# Patient Record
Sex: MALE | Race: WHITE | Employment: FULL TIME | URBAN - METROPOLITAN AREA
[De-identification: names, ages, dates, MRNs, and addresses within clinical notes are randomized per-mention and may not be internally consistent; named-entity substitution may affect disease eponyms.]

---

## 2019-12-19 ENCOUNTER — OFFICE VISIT (OUTPATIENT)
Dept: ENDOCRINOLOGY | Age: 69
End: 2019-12-19
Payer: MEDICARE

## 2019-12-19 ENCOUNTER — TELEPHONE (OUTPATIENT)
Dept: ENDOCRINOLOGY | Age: 69
End: 2019-12-19

## 2019-12-19 VITALS
HEART RATE: 78 BPM | HEIGHT: 69 IN | BODY MASS INDEX: 30.24 KG/M2 | DIASTOLIC BLOOD PRESSURE: 78 MMHG | WEIGHT: 204.2 LBS | RESPIRATION RATE: 16 BRPM | OXYGEN SATURATION: 95 % | SYSTOLIC BLOOD PRESSURE: 136 MMHG

## 2019-12-19 DIAGNOSIS — Z79.4 TYPE 2 DIABETES MELLITUS WITHOUT COMPLICATION, WITH LONG-TERM CURRENT USE OF INSULIN (HCC): ICD-10-CM

## 2019-12-19 DIAGNOSIS — E55.9 VITAMIN D DEFICIENCY: ICD-10-CM

## 2019-12-19 DIAGNOSIS — E11.9 TYPE 2 DIABETES MELLITUS WITHOUT COMPLICATION, WITH LONG-TERM CURRENT USE OF INSULIN (HCC): ICD-10-CM

## 2019-12-19 LAB — HBA1C MFR BLD: 7.1 %

## 2019-12-19 PROCEDURE — 83036 HEMOGLOBIN GLYCOSYLATED A1C: CPT | Performed by: INTERNAL MEDICINE

## 2019-12-19 PROCEDURE — 99204 OFFICE O/P NEW MOD 45 MIN: CPT | Performed by: INTERNAL MEDICINE

## 2019-12-19 RX ORDER — METFORMIN HYDROCHLORIDE 500 MG/1
TABLET, EXTENDED RELEASE ORAL
Qty: 360 TABLET | Refills: 3 | Status: SHIPPED | OUTPATIENT
Start: 2019-12-19 | End: 2020-06-22 | Stop reason: SINTOL

## 2019-12-19 RX ORDER — FLASH GLUCOSE SCANNING READER
EACH MISCELLANEOUS
COMMUNITY
End: 2022-07-06

## 2019-12-19 RX ORDER — FLASH GLUCOSE SENSOR
KIT MISCELLANEOUS
Qty: 6 EACH | Refills: 3 | Status: SHIPPED | OUTPATIENT
Start: 2019-12-19 | End: 2020-09-25 | Stop reason: SDUPTHER

## 2020-06-12 RX ORDER — INSULIN GLARGINE 100 [IU]/ML
35 INJECTION, SOLUTION SUBCUTANEOUS NIGHTLY
Qty: 15 PEN | Refills: 3 | Status: SHIPPED
Start: 2020-06-12 | End: 2020-06-22 | Stop reason: CLARIF

## 2020-06-22 ENCOUNTER — TELEPHONE (OUTPATIENT)
Dept: ENDOCRINOLOGY | Age: 70
End: 2020-06-22

## 2020-06-22 RX ORDER — INSULIN GLARGINE 100 [IU]/ML
35 INJECTION, SOLUTION SUBCUTANEOUS NIGHTLY
COMMUNITY
End: 2020-09-02 | Stop reason: SDUPTHER

## 2020-06-22 NOTE — TELEPHONE ENCOUNTER
The patient wanted to let you know that he dc'd his Metformin ER. He was concerned about the recall, but he also was still getting diarrhea from the ER, and he cannot tolerate the IR at all. He will send in blood sugar in 2wks.

## 2020-06-29 RX ORDER — METFORMIN HYDROCHLORIDE 500 MG/1
1000 TABLET, EXTENDED RELEASE ORAL 2 TIMES DAILY
Qty: 360 TABLET | Refills: 3 | Status: SHIPPED
Start: 2020-06-29 | End: 2021-07-15 | Stop reason: SDUPTHER

## 2020-09-02 RX ORDER — INSULIN GLARGINE 100 [IU]/ML
35 INJECTION, SOLUTION SUBCUTANEOUS NIGHTLY
Qty: 5 PEN | Refills: 3 | Status: SHIPPED
Start: 2020-09-02 | End: 2021-05-10 | Stop reason: SDUPTHER

## 2020-09-02 RX ORDER — PEN NEEDLE, DIABETIC 32GX 5/32"
NEEDLE, DISPOSABLE MISCELLANEOUS
Qty: 100 EACH | Refills: 5 | Status: SHIPPED
Start: 2020-09-02 | End: 2021-06-24

## 2020-09-25 RX ORDER — FLASH GLUCOSE SENSOR
KIT MISCELLANEOUS
Qty: 6 EACH | Refills: 3 | Status: SHIPPED
Start: 2020-09-25 | End: 2022-07-06

## 2020-11-25 ENCOUNTER — NURSE ONLY (OUTPATIENT)
Dept: ENDOCRINOLOGY | Age: 70
End: 2020-11-25
Payer: COMMERCIAL

## 2020-11-25 DIAGNOSIS — E11.9 TYPE 2 DIABETES MELLITUS WITHOUT COMPLICATION, WITH LONG-TERM CURRENT USE OF INSULIN (HCC): Primary | ICD-10-CM

## 2020-11-25 DIAGNOSIS — Z79.4 TYPE 2 DIABETES MELLITUS WITHOUT COMPLICATION, WITH LONG-TERM CURRENT USE OF INSULIN (HCC): Primary | ICD-10-CM

## 2020-11-25 LAB — HBA1C MFR BLD: 7.5 %

## 2020-11-25 PROCEDURE — 83036 HEMOGLOBIN GLYCOSYLATED A1C: CPT | Performed by: INTERNAL MEDICINE

## 2020-12-02 ENCOUNTER — VIRTUAL VISIT (OUTPATIENT)
Dept: ENDOCRINOLOGY | Age: 70
End: 2020-12-02
Payer: COMMERCIAL

## 2020-12-02 ENCOUNTER — TELEPHONE (OUTPATIENT)
Dept: ENDOCRINOLOGY | Age: 70
End: 2020-12-02

## 2020-12-02 PROCEDURE — 3051F HG A1C>EQUAL 7.0%<8.0%: CPT | Performed by: INTERNAL MEDICINE

## 2020-12-02 PROCEDURE — 99214 OFFICE O/P EST MOD 30 MIN: CPT | Performed by: INTERNAL MEDICINE

## 2020-12-02 RX ORDER — BLOOD-GLUCOSE SENSOR
EACH MISCELLANEOUS
Qty: 9 EACH | Refills: 3 | Status: SHIPPED | OUTPATIENT
Start: 2020-12-02 | End: 2020-12-17 | Stop reason: SDUPTHER

## 2020-12-02 RX ORDER — GLUCOSAMINE HCL/CHONDROITIN SU 500-400 MG
CAPSULE ORAL
Qty: 250 STRIP | Refills: 5 | Status: SHIPPED
Start: 2020-12-02 | End: 2021-07-06 | Stop reason: ALTCHOICE

## 2020-12-02 RX ORDER — BLOOD-GLUCOSE TRANSMITTER
1 EACH MISCELLANEOUS ONCE
Qty: 1 EACH | Refills: 0 | Status: SHIPPED | OUTPATIENT
Start: 2020-12-02 | End: 2020-12-02

## 2020-12-02 RX ORDER — PEN NEEDLE, DIABETIC 32GX 5/32"
NEEDLE, DISPOSABLE MISCELLANEOUS
Qty: 100 EACH | Refills: 5 | Status: SHIPPED
Start: 2020-12-02 | End: 2021-06-24 | Stop reason: SDUPTHER

## 2020-12-02 RX ORDER — BLOOD-GLUCOSE,RECEIVER,CONT
1 EACH MISCELLANEOUS ONCE
Qty: 1 DEVICE | Refills: 0 | Status: SHIPPED | OUTPATIENT
Start: 2020-12-02 | End: 2020-12-02

## 2020-12-02 NOTE — PROGRESS NOTES
Melissa Acuña was read the following message We want to confirm that, for purposes of billing, this is a virtual visit with your provider for which we will submit a claim for reimbursement with your insurance company. You will be responsible for any copays, coinsurance amounts or other amounts not covered by your insurance company. If you do not accept this, unfortunately we will not be able to schedule a virtual visit with the provider. Do you accept?  Benjamin Mosquera responded YES

## 2020-12-02 NOTE — LETTER
[address] [place] [address] Department of Endocrinology Diabetes and Metabolism   [address] [facility] [address]   Phone: [phone]  Fax: [phone]      Provider: Melina Hodgkins MD  Primary Care Physician: Julio Bills MD     Patient: Jacquelin Stroud  YOB: 1950  Date of Visit: 12/2/2020      Dear Dr. Julio Bills MD   I had the pleasure of seeing your patient Jacquelin Stroud today at endocrine clinic for follow up visit and I enclosed a copy of the office visit completed today. Thank you very much for asking us to participate in the care of this very pleasant patient. Please don't hesitate to call if there are any further questions or concerns. Sincerely   Melina Hodgkins MD  Endocrinologist, [address] [address]   Phone: [phone]  Fax: [phone]      [address] [place] [address] Department of Endocrinology Diabetes and Metabolism   [address] [place] [address]   Phone: [phone]  Fax: [phone]    Date of Service: 12/2/2020    Primary Care Physician: Julio Bills MD  Provider: Melina Hodgkins MD     Reason for the visit:  DM type 2 on insulin     History of Present Illness: The history is provided by the patient. No  was used. Accuracy of the patient data is excellent.   Jacquelin Stroud is a very pleasant 78 y.o. male seen today for diabetes management     Jacquelin Stroud was diagnosed with diabetes at age 50 and he is currently on Lantus 35 units daily at bedtime, Metformin 1000 mg BID, Jardiance 25 mg daily  The patient has been checking blood sugar 5-8 times day, most readings at goal   Most recent A1c results summarized below  Lab Results   Component Value Date    LABA1C 7.5 11/25/2020    LABA1C 7.1 12/19/2019    LABA1C 7.4 12/07/2016     Patient reported hypoglycemic episodes  The patient has been mindful of what has been eating and following diabetic diet as encouraged I reviewed current medications and the patient has no issues with diabetes medications  Brianna Barrera is up to date with eye exam and denied any history of diabetic retinopathy   The patient performs  own feet care  Microvascular complications:  No Retinopathy, Nephropathy or Neuropathy   Macrovascular complications: no CAD, PVD, or Stroke  The patient refuses Flushot and pneumonia vaccine     PAST MEDICAL HISTORY   Past Medical History:   Diagnosis Date    Essential hypertension 10/15/2015    Type 2 diabetes mellitus without complication ([facility] [address] 08/59/4941     PAST SURGICAL HISTORY   No past surgical history on file. SOCIAL HISTORY   Tobacco:   reports that he has quit smoking. He has never used smokeless tobacco.  Alcol:   has no history on file for alcohol. Illicit Drugs:   has no history on file for drug. FAMILY HISTORY   Family History   Problem Relation Age of Onset    Diabetes Father     Diabetes Paternal Grandfather      ALLERGIES AND DRUG REACTIONS   Allergies   Allergen Reactions    Metformin And Related Diarrhea    Beeswax        CURRENT MEDICATIONS   Current Outpatient Medications   Medication Sig Dispense Refill    Insulin Pen Needle (BD PEN NEEDLE PATTIE U/F) 32G X 4 MM MISC Uses with Lantus once a day 100 each 5    blood glucose monitor strips One-Touch-2 Ultra strips.  Check 4 times/day before meals and at bedtime and as needed for symptoms of irregular blood glucose 250 strip 5    Continuous Blood Gluc  (DEXCOM G6 ) TIFFANY 1 Device by Does not apply route once for 1 dose 1 Device 0    Continuous Blood Gluc Sensor (DEXCOM G6 SENSOR) MISC Change sensor every 10 days 9 each 3    Continuous Blood Gluc Transmit (DEXCOM G6 TRANSMITTER) MISC 1 each by Does not apply route once for 1 dose 1 each 0    Continuous Blood Gluc Sensor (FREESTYLE KATALINA 14 DAY SENSOR) MISC Change sensor every 14 days 6 each 3    insulin glargine (BASAGLAR KWIKPEN) 100 UNIT/ML injection pen Inject 35 Units into the skin nightly 5 pen 3    Insulin Pen Needle (BD PEN NEEDLE PATTIE U/F) 32G X 4 MM MISC Uses daily with insulin 100 each 5    metFORMIN (GLUCOPHAGE XR) 500 MG extended release tablet Take 2 tablets by mouth 2 times daily 360 tablet 3    Continuous Blood Gluc  (FREESTYLE KATALINA 14 DAY READER) TIFFANY by Does not apply route      aspirin 81 MG tablet Take 81 mg by mouth daily      empagliflozin (JARDIANCE) 25 MG tablet Take 25 mg by mouth daily 90 tablet 3    Insulin Pen Needle (BD PEN NEEDLE PATTIE U/F) 32G X 4 MM MISC Inject 4 times daily 100 each 11    amLODIPine (NORVASC) 10 MG tablet TAKE 1 TABLET BY MOUTH EVERY DAY 30 tablet 2    atorvastatin (LIPITOR) 80 MG tablet TAKE 1 TABLET BY MOUTH EVERY NIGHT AT BEDTIME 30 tablet 2    terazosin (HYTRIN) 2 MG capsule TAKE 1 CAPSULE BY MOUTH TWICE DAILY 60 capsule 2    lisinopril (PRINIVIL;ZESTRIL) 20 MG tablet Take 1 tablet by mouth daily 30 tablet 3    glucose blood VI test strips (ONE TOUCH ULTRA TEST) strip Test 2-4 times daily 100 each 3    EPINEPHrine (EPIPEN 2-RENITA) 0.3 MG/0.3ML SOAJ injection Inject 0.3 mLs into the skin once for 1 dose Use as directed for allergic reaction 0.3 mL 0     No current facility-administered medications for this visit. Review of Systems  Constitutional: No fever, no chills, no diaphoresis, no generalized weakness. HEENT: No blurred vision, No sore throat, no ear pain, no hair loss  Neck: denied any neck swelling, difficulty swallowing,   Cardio-pulmonary: No CP, SOB or palpitation, No orthopnea or PND. No cough or wheezing. GI: No N/V/D, no constipation, No abdominal pain, no melena or hematochezia   : Denied any dysuria, hematuria, flank pain, discharge, or incontinence. Skin: denied any rash, ulcer, Hirsute, or hyperpigmentation. MSK: denied any joint deformity, joint pain/swelling, muscle pain, or back pain.   Neuro: no numbness, no tingling, no weakness, _    OBJECTIVE There were no vitals taken for this visit. BP Readings from Last 4 Encounters:   12/19/19 136/78   12/07/16 125/67   03/31/16 140/80   11/16/15 143/86     Wt Readings from Last 6 Encounters:   12/19/19 204 lb 3.2 oz (92.6 kg)   12/07/16 216 lb (98 kg)   03/31/16 212 lb (96.2 kg)   11/16/15 204 lb (92.5 kg)   10/15/15 203 lb (92.1 kg)     Physical examination:  Due to this being a TeleHealth encounter, evaluation of the following organ systems is limited: Vitals/Constitutional/EENT/Resp/CV/GI//MS/Neuro/Skin/Heme-Lymph-Imm. Modified physical exam through Telemedicine camera    General: Communicating well via camera   Neck: no obvious neck mass. No obvious neck deformity     CVS: no distress   Chest: no distress. Chest is moving with respiration    Extremities:  no visible tremor  Skin: No visible rashes as seen from camera   Musculoskeletal: no visible deformity  Neuro: Alert and oriented to person, place, and time. Psychiatric: Normal mood and affect.  Behavior is normal    Review of Laboratory Data:  I personally reviewed the following lab:  Lab Results   Component Value Date/Time    WBC 8.3 10/15/2015 11:00 AM    RBC 5.26 10/15/2015 11:00 AM    HGB 14.8 10/15/2015 11:00 AM    HCT 45.6 10/15/2015 11:00 AM    MCV 86.6 10/15/2015 11:00 AM    MCH 28.0 10/15/2015 11:00 AM    MCHC 32.4 10/15/2015 11:00 AM    RDW 14.0 10/15/2015 11:00 AM     10/15/2015 11:00 AM    MPV 8.4 10/15/2015 11:00 AM      Lab Results   Component Value Date/Time     10/15/2015 11:00 AM    K 4.6 10/15/2015 11:00 AM    CO2 26 10/15/2015 11:00 AM    BUN 19 10/15/2015 11:00 AM    CREATININE 1.1 10/15/2015 11:00 AM    CALCIUM 9.9 10/15/2015 11:00 AM    LABGLOM >60 10/15/2015 11:00 AM    GFRAA >60 10/15/2015 11:00 AM      No results found for: TSH, T4FREE, S5YRDEI, FT3, V8UAQVW, TSI, TPOABS, THGAB  Lab Results   Component Value Date    LABA1C 7.5 11/25/2020    GLUCOSE 155 10/15/2015    MALBCR 84.8 10/15/2015 LABMICR 33.9 10/15/2015    LABCREA 40 10/15/2015     Lab Results   Component Value Date    LABA1C 7.5 11/25/2020    LABA1C 7.1 12/19/2019    LABA1C 7.4 12/07/2016     Lab Results   Component Value Date    CHOL 167 10/15/2015    TRIG 111 10/15/2015    HDL 44 10/15/2015     No results found for: [address] [address] Records/Labs/Images review:   I personally reviewed and summarized previous records   All labs and imaging studies were independently reviewed     ASSESSMENT & RECOMMENDATIONS   Ted Snider, a 78 y.o.-old male seen in for the following issues     Diabetes Mellitus Type 2     · A1c 7.5%   · Change Lantus 30 units daily at bedtime, Metformin 1000 mg BID, Jardiance 25 mg daily  · Pt was advised to continue checking BG 4-8 times a day with meals and at night an dnas needed for high and low BS   · Will order DEXCOM CGM   · Discussed with patient A1c and blood sugar goals   · Patient up to date with the routine diabetes maintenance and prevention  · Diabetes labs before next visit     Hyperlipidemia   · Continue Lipitor 80 mg daily   · Check level before next visit     HTN  · Under good control, continue Lisinopril 20 mg daily and Norvasc 10 mg daily     vitD deficiency   · Check vitD level     Return in about 4 months (around 4/2/2021) for DM type 2 . The above issues were reviewed with the patient who understood and agreed with the plan. Thank you for allowing us to participate in the care of this patient. Please do not hesitate to contact us with any additional questions. Diagnosis Orders   1. Type 2 diabetes mellitus without complication, with long-term current use of insulin ([facility])  Insulin Pen Needle (BD PEN NEEDLE PATTEI U/F) 32G X 4 MM MISC    blood glucose monitor strips   2. Vitamin D deficiency     3.  Type 2 diabetes mellitus with other specified complication, with long-term current use of insulin ([address]  Continuous Blood Gluc  ([address]) [address] Continuous Blood Gluc Sensor (DEXCOM G6 SENSOR) MISC    Continuous Blood Gluc Transmit (DEXCOM G6 TRANSMITTER) MISC     Neli Cuellar MD  Endocrinologist, [facility] - [facility] Diabetes Care and Endocrinology   [address] [facility], [address] [place] [address],[address] [address]   Phone: [phone]  Fax: [phone]  --------------------------------------------  An electronic signature was used to authenticate this note. Naomi Suazo MD on 12/2/2020 at 9:37 PM    This visit was performed as a virtual video visit using a synchronous, two-way, audio-video telehealth technology platform  This Virtual  Visit was conducted, with patient's consent, to reduce the patient's risk of exposure to COVID-19 and provide continuity of care.

## 2020-12-02 NOTE — PROGRESS NOTES
[address] [place] [address] Department of Endocrinology Diabetes and Metabolism   [address] [facility] [address]   Phone: [phone]  Fax: [phone]    Date of Service: 12/2/2020    Primary Care Physician: Abisai Florian MD  Provider: Fab Henderson MD     Reason for the visit:  DM type 2 on insulin     History of Present Illness: The history is provided by the patient. No  was used. Accuracy of the patient data is excellent. Ashley Moore is a very pleasant 78 y.o. male seen today for diabetes management     Ashley Moore was diagnosed with diabetes at age 50 and he is currently on Lantus 35 units daily at bedtime, Metformin 1000 mg BID, Jardiance 25 mg daily  The patient has been checking blood sugar 5-8 times day, most readings at goal   Most recent A1c results summarized below  Lab Results   Component Value Date    LABA1C 7.5 11/25/2020    LABA1C 7.1 12/19/2019    LABA1C 7.4 12/07/2016     Patient reported hypoglycemic episodes  The patient has been mindful of what has been eating and following diabetic diet as encouraged  I reviewed current medications and the patient has no issues with diabetes medications  Ashley Moore is up to date with eye exam and denied any history of diabetic retinopathy   The patient performs  own feet care  Microvascular complications:  No Retinopathy, Nephropathy or Neuropathy   Macrovascular complications: no CAD, PVD, or Stroke  The patient refuses Flushot and pneumonia vaccine     PAST MEDICAL HISTORY   Past Medical History:   Diagnosis Date    Essential hypertension 10/15/2015    Type 2 diabetes mellitus without complication ([facility] [address] 25/18/1481     PAST SURGICAL HISTORY   No past surgical history on file. SOCIAL HISTORY   Tobacco:   reports that he has quit smoking. He has never used smokeless tobacco.  Alcol:   has no history on file for alcohol. Illicit Drugs:   has no history on file for drug.     FAMILY HISTORY   Family History   Problem Relation Age of Onset    Diabetes Father     Diabetes Paternal Grandfather      ALLERGIES AND DRUG REACTIONS   Allergies   Allergen Reactions    Metformin And Related Diarrhea    Beeswax        CURRENT MEDICATIONS   Current Outpatient Medications   Medication Sig Dispense Refill    Insulin Pen Needle (BD PEN NEEDLE PATTIE U/F) 32G X 4 MM MISC Uses with Lantus once a day 100 each 5    blood glucose monitor strips One-Touch-2 Ultra strips.  Check 4 times/day before meals and at bedtime and as needed for symptoms of irregular blood glucose 250 strip 5    Continuous Blood Gluc  (DEXCOM G6 ) TIFFANY 1 Device by Does not apply route once for 1 dose 1 Device 0    Continuous Blood Gluc Sensor (DEXCOM G6 SENSOR) MISC Change sensor every 10 days 9 each 3    Continuous Blood Gluc Transmit (DEXCOM G6 TRANSMITTER) MISC 1 each by Does not apply route once for 1 dose 1 each 0    Continuous Blood Gluc Sensor (FREESTYLE KATALINA 14 DAY SENSOR) MISC Change sensor every 14 days 6 each 3    insulin glargine (BASAGLAR KWIKPEN) 100 UNIT/ML injection pen Inject 35 Units into the skin nightly 5 pen 3    Insulin Pen Needle (BD PEN NEEDLE PATTIE U/F) 32G X 4 MM MISC Uses daily with insulin 100 each 5    metFORMIN (GLUCOPHAGE XR) 500 MG extended release tablet Take 2 tablets by mouth 2 times daily 360 tablet 3    Continuous Blood Gluc  (FREESTYLE KATALINA 14 DAY READER) TIFFANY by Does not apply route      aspirin 81 MG tablet Take 81 mg by mouth daily      empagliflozin (JARDIANCE) 25 MG tablet Take 25 mg by mouth daily 90 tablet 3    Insulin Pen Needle (BD PEN NEEDLE PATTIE U/F) 32G X 4 MM MISC Inject 4 times daily 100 each 11    amLODIPine (NORVASC) 10 MG tablet TAKE 1 TABLET BY MOUTH EVERY DAY 30 tablet 2    atorvastatin (LIPITOR) 80 MG tablet TAKE 1 TABLET BY MOUTH EVERY NIGHT AT BEDTIME 30 tablet 2    terazosin (HYTRIN) 2 MG capsule TAKE 1 CAPSULE BY MOUTH TWICE DAILY 60 capsule 2    lisinopril (PRINIVIL;ZESTRIL) 20 MG tablet Take 1 tablet by mouth daily 30 tablet 3    glucose blood VI test strips (ONE TOUCH ULTRA TEST) strip Test 2-4 times daily 100 each 3    EPINEPHrine (EPIPEN 2-RENITA) 0.3 MG/0.3ML SOAJ injection Inject 0.3 mLs into the skin once for 1 dose Use as directed for allergic reaction 0.3 mL 0     No current facility-administered medications for this visit. Review of Systems  Constitutional: No fever, no chills, no diaphoresis, no generalized weakness. HEENT: No blurred vision, No sore throat, no ear pain, no hair loss  Neck: denied any neck swelling, difficulty swallowing,   Cardio-pulmonary: No CP, SOB or palpitation, No orthopnea or PND. No cough or wheezing. GI: No N/V/D, no constipation, No abdominal pain, no melena or hematochezia   : Denied any dysuria, hematuria, flank pain, discharge, or incontinence. Skin: denied any rash, ulcer, Hirsute, or hyperpigmentation. MSK: denied any joint deformity, joint pain/swelling, muscle pain, or back pain. Neuro: no numbness, no tingling, no weakness, _    OBJECTIVE    There were no vitals taken for this visit. BP Readings from Last 4 Encounters:   12/19/19 136/78   12/07/16 125/67   03/31/16 140/80   11/16/15 143/86     Wt Readings from Last 6 Encounters:   12/19/19 204 lb 3.2 oz (92.6 kg)   12/07/16 216 lb (98 kg)   03/31/16 212 lb (96.2 kg)   11/16/15 204 lb (92.5 kg)   10/15/15 203 lb (92.1 kg)     Physical examination:  Due to this being a TeleHealth encounter, evaluation of the following organ systems is limited: Vitals/Constitutional/EENT/Resp/CV/GI//MS/Neuro/Skin/Heme-Lymph-Imm. Modified physical exam through Telemedicine camera    General: Communicating well via camera   Neck: no obvious neck mass. No obvious neck deformity     CVS: no distress   Chest: no distress.  Chest is moving with respiration    Extremities:  no visible tremor  Skin: No visible rashes as seen from camera   Musculoskeletal: no visible · Patient up to date with the routine diabetes maintenance and prevention  · Diabetes labs before next visit     Hyperlipidemia   · Continue Lipitor 80 mg daily   · Check level before next visit     HTN  · Under good control, continue Lisinopril 20 mg daily and Norvasc 10 mg daily     vitD deficiency   · Check vitD level     Return in about 4 months (around 4/2/2021) for DM type 2 . The above issues were reviewed with the patient who understood and agreed with the plan. Thank you for allowing us to participate in the care of this patient. Please do not hesitate to contact us with any additional questions. Diagnosis Orders   1. Type 2 diabetes mellitus without complication, with long-term current use of insulin ([facility])  Insulin Pen Needle (BD PEN NEEDLE PATTIE U/F) 32G X 4 MM MISC    blood glucose monitor strips   2. Vitamin D deficiency     3. Type 2 diabetes mellitus with other specified complication, with long-term current use of insulin ([facility])  Continuous Blood Gluc  (DEXCOM G6 ) TIFFANY    Continuous Blood Gluc Sensor (DEXCOM G6 SENSOR) MISC    Continuous Blood Gluc Transmit (DEXCOM G6 TRANSMITTER) MISC     Neli Cuellar MD  Endocrinologist, [facility] - [facility] Diabetes Care and Endocrinology   [address] [place] [address]   Phone: [phone]  Fax: [phone]  --------------------------------------------  An electronic signature was used to authenticate this note. Naomi Suazo MD on 12/2/2020 at 9:37 PM    This visit was performed as a virtual video visit using a synchronous, two-way, audio-video telehealth technology platform  This Virtual  Visit was conducted, with patient's consent, to reduce the patient's risk of exposure to COVID-19 and provide continuity of care.

## 2020-12-17 RX ORDER — BLOOD-GLUCOSE TRANSMITTER
EACH MISCELLANEOUS
Qty: 1 EACH | Refills: 3 | Status: SHIPPED
Start: 2020-12-17 | End: 2022-07-06

## 2020-12-17 RX ORDER — BLOOD-GLUCOSE SENSOR
EACH MISCELLANEOUS
Qty: 9 EACH | Refills: 3 | Status: SHIPPED
Start: 2020-12-17 | End: 2022-07-06

## 2021-05-10 DIAGNOSIS — Z79.4 TYPE 2 DIABETES MELLITUS WITHOUT COMPLICATION, WITH LONG-TERM CURRENT USE OF INSULIN (HCC): ICD-10-CM

## 2021-05-10 DIAGNOSIS — E11.9 TYPE 2 DIABETES MELLITUS WITHOUT COMPLICATION, WITH LONG-TERM CURRENT USE OF INSULIN (HCC): ICD-10-CM

## 2021-05-10 RX ORDER — INSULIN GLARGINE 100 [IU]/ML
30 INJECTION, SOLUTION SUBCUTANEOUS NIGHTLY
Qty: 5 PEN | Refills: 5 | Status: SHIPPED
Start: 2021-05-10 | End: 2022-03-03 | Stop reason: SDUPTHER

## 2021-06-07 ENCOUNTER — TELEPHONE (OUTPATIENT)
Dept: ENDOCRINOLOGY | Age: 71
End: 2021-06-07

## 2021-06-07 NOTE — TELEPHONE ENCOUNTER
His BUN was slightly elevated but both Creatinine and GFR are very good.  Most common cause of elevated BUN is dehydration     It should be ok to continue current DM medications

## 2021-06-07 NOTE — TELEPHONE ENCOUNTER
I spoke with Hua Varghese and gave him the update on BUN and cont. With same DM meds. I also stated he may want to try and get more fluids in during the day. He is concerned also about his BS going up into the mid to high 200 after he eats but he said they do come down after a long while, should we have him send in some BS logs to look at this? He will be starting radiation in a couple of weeks, he wanted to let us know that also.

## 2021-06-11 NOTE — TELEPHONE ENCOUNTER
He has Dexacom, we need his email address. He is at [facility] today for appointments.

## 2021-06-24 DIAGNOSIS — Z79.4 TYPE 2 DIABETES MELLITUS WITHOUT COMPLICATION, WITH LONG-TERM CURRENT USE OF INSULIN (HCC): ICD-10-CM

## 2021-06-24 DIAGNOSIS — E11.9 TYPE 2 DIABETES MELLITUS WITHOUT COMPLICATION, WITH LONG-TERM CURRENT USE OF INSULIN (HCC): ICD-10-CM

## 2021-06-24 RX ORDER — PEN NEEDLE, DIABETIC 32GX 5/32"
NEEDLE, DISPOSABLE MISCELLANEOUS
Qty: 100 EACH | Refills: 3 | Status: SHIPPED
Start: 2021-06-24 | End: 2022-07-28 | Stop reason: SDUPTHER

## 2021-07-06 DIAGNOSIS — Z79.4 TYPE 2 DIABETES MELLITUS WITHOUT COMPLICATION, WITH LONG-TERM CURRENT USE OF INSULIN (HCC): Primary | ICD-10-CM

## 2021-07-06 DIAGNOSIS — E11.9 TYPE 2 DIABETES MELLITUS WITHOUT COMPLICATION, WITH LONG-TERM CURRENT USE OF INSULIN (HCC): Primary | ICD-10-CM

## 2021-07-12 ENCOUNTER — TELEPHONE (OUTPATIENT)
Dept: ENDOCRINOLOGY | Age: 71
End: 2021-07-12

## 2021-07-12 NOTE — TELEPHONE ENCOUNTER
dexcom dl   Called patient left message to change diabetic regimen to lantus 30 hs to lantus 35 units    Left message to return our call that he got our message

## 2021-07-15 RX ORDER — METFORMIN HYDROCHLORIDE 500 MG/1
1000 TABLET, EXTENDED RELEASE ORAL 2 TIMES DAILY
Qty: 360 TABLET | Refills: 3 | Status: SHIPPED
Start: 2021-07-15 | End: 2021-07-19 | Stop reason: SDUPTHER

## 2021-07-19 RX ORDER — METFORMIN HYDROCHLORIDE 500 MG/1
1000 TABLET, EXTENDED RELEASE ORAL 2 TIMES DAILY
Qty: 360 TABLET | Refills: 3 | Status: SHIPPED
Start: 2021-07-19 | End: 2022-07-28 | Stop reason: SDUPTHER

## 2022-03-03 ENCOUNTER — TELEMEDICINE (OUTPATIENT)
Dept: ENDOCRINOLOGY | Age: 72
End: 2022-03-03
Payer: COMMERCIAL

## 2022-03-03 DIAGNOSIS — E55.9 VITAMIN D DEFICIENCY: ICD-10-CM

## 2022-03-03 DIAGNOSIS — Z91.119 DIETARY NONCOMPLIANCE: ICD-10-CM

## 2022-03-03 DIAGNOSIS — Z79.4 TYPE 2 DIABETES MELLITUS WITHOUT COMPLICATION, WITH LONG-TERM CURRENT USE OF INSULIN (HCC): ICD-10-CM

## 2022-03-03 DIAGNOSIS — E66.09 CLASS 1 OBESITY DUE TO EXCESS CALORIES WITHOUT SERIOUS COMORBIDITY WITH BODY MASS INDEX (BMI) OF 30.0 TO 30.9 IN ADULT: ICD-10-CM

## 2022-03-03 DIAGNOSIS — Z79.4 TYPE 2 DIABETES MELLITUS WITHOUT COMPLICATION, WITH LONG-TERM CURRENT USE OF INSULIN (HCC): Primary | ICD-10-CM

## 2022-03-03 DIAGNOSIS — E11.9 TYPE 2 DIABETES MELLITUS WITHOUT COMPLICATION, WITH LONG-TERM CURRENT USE OF INSULIN (HCC): ICD-10-CM

## 2022-03-03 DIAGNOSIS — E11.9 TYPE 2 DIABETES MELLITUS WITHOUT COMPLICATION, WITH LONG-TERM CURRENT USE OF INSULIN (HCC): Primary | ICD-10-CM

## 2022-03-03 PROCEDURE — 99214 OFFICE O/P EST MOD 30 MIN: CPT | Performed by: NURSE PRACTITIONER

## 2022-03-03 RX ORDER — INSULIN GLARGINE 100 [IU]/ML
30 INJECTION, SOLUTION SUBCUTANEOUS NIGHTLY
Qty: 15 ML | Refills: 5 | Status: SHIPPED
Start: 2022-03-03 | End: 2022-07-06

## 2022-03-03 NOTE — PROGRESS NOTES
never used smokeless tobacco.  Alcohol:   has no history on file for alcohol use. Drugs:   has no history on file for drug use.     FAMILY HISTORY   Family History   Problem Relation Age of Onset    Diabetes Father     Diabetes Paternal Grandfather        ALLERGIES AND DRUG REACTIONS   Allergies   Allergen Reactions    Metformin And Related Diarrhea    Beeswax        CURRENT MEDICATIONS   Current Outpatient Medications   Medication Sig Dispense Refill    insulin glargine (BASAGLAR KWIKPEN) 100 UNIT/ML injection pen Inject 30 Units into the skin nightly (Patient taking differently: Inject 31 Units into the skin nightly ) 15 mL 5    metFORMIN (GLUCOPHAGE XR) 500 MG extended release tablet Take 2 tablets by mouth 2 times daily 360 tablet 3    empagliflozin (JARDIANCE) 25 MG tablet Take 25 mg by mouth daily 90 tablet 3    blood glucose test strips (ONE TOUCH ULTRA TEST) strip To test 4x/day 50 each 11    Insulin Pen Needle (BD PEN NEEDLE PATTIE U/F) 32G X 4 MM MISC To use 1x/day 100 each 3    Continuous Blood Gluc Sensor (DEXCOM G6 SENSOR) MISC Change sensor every 10 days 9 each 3    Continuous Blood Gluc Transmit (DEXCOM G6 TRANSMITTER) MISC To use with sensor to change every 90 days 1 each 3    Continuous Blood Gluc Sensor (FREESTYLE KATALINA 14 DAY SENSOR) MISC Change sensor every 14 days 6 each 3    Continuous Blood Gluc  (FREESTYLE KATALINA 14 DAY READER) TIFFANY by Does not apply route      aspirin 81 MG tablet Take 81 mg by mouth daily      amLODIPine (NORVASC) 10 MG tablet TAKE 1 TABLET BY MOUTH EVERY DAY 30 tablet 2    atorvastatin (LIPITOR) 80 MG tablet TAKE 1 TABLET BY MOUTH EVERY NIGHT AT BEDTIME 30 tablet 2    terazosin (HYTRIN) 2 MG capsule TAKE 1 CAPSULE BY MOUTH TWICE DAILY 60 capsule 2    lisinopril (PRINIVIL;ZESTRIL) 20 MG tablet Take 1 tablet by mouth daily 30 tablet 3    EPINEPHrine (EPIPEN 2-RENITA) 0.3 MG/0.3ML SOAJ injection Inject 0.3 mLs into the skin once for 1 dose Use as directed for allergic reaction 0.3 mL 0     No current facility-administered medications for this visit. Review of Systems  Constitutional: No fever, no chills, no diaphoresis, no generalized weakness. HEENT: No blurred vision, No sore throat, no ear pain, no hair loss  Neck: denied any neck swelling, difficulty swallowing,   Cardio-pulmonary: No CP, SOB or palpitation, No orthopnea or PND. No cough or wheezing. GI: No N/V/D, no constipation, No abdominal pain, no melena or hematochezia   : Denied any dysuria, hematuria, flank pain, discharge, or incontinence. Skin: denied any rash, ulcer, Hirsute, or hyperpigmentation. MSK: denied any joint deformity, joint pain/swelling, muscle pain, or back pain. Neuro: no numbness, no tingling, no weakness,    OBJECTIVE    There were no vitals taken for this visit. BP Readings from Last 4 Encounters:   12/19/19 136/78   12/07/16 125/67   03/31/16 140/80   11/16/15 143/86     Wt Readings from Last 6 Encounters:   12/19/19 204 lb 3.2 oz (92.6 kg)   12/07/16 216 lb (98 kg)   03/31/16 212 lb (96.2 kg)   11/16/15 204 lb (92.5 kg)   10/15/15 203 lb (92.1 kg)       Physical examination:  Due to this being a TeleHealth encounter, evaluation of the following organ systems is limited: Vitals/Constitutional/EENT/Resp/CV/GI//MS/Neuro/Skin/Heme-Lymph-Imm. Modified physical exam through Telemedicine camera    General: Communicating well via camera   Neck: no obvious neck mass. No obvious neck deformity     CVS: no distress   Chest: no distress. Chest is moving with respiration    Extremities:  no visible tremor  Skin: No visible rashes as seen from camera   Musculoskeletal: no visible deformity  Neuro: Alert and oriented to person, place, and time. Psychiatric: Normal mood and affect.  Behavior is normal    Review of Laboratory Data:  I personally reviewed the following lab:  Lab Results   Component Value Date/Time    WBC 8.3 10/15/2015 11:00 AM    RBC 5.26 10/15/2015 11:00 AM HGB 14.8 10/15/2015 11:00 AM    HCT 45.6 10/15/2015 11:00 AM    MCV 86.6 10/15/2015 11:00 AM    MCH 28.0 10/15/2015 11:00 AM    MCHC 32.4 10/15/2015 11:00 AM    RDW 14.0 10/15/2015 11:00 AM     10/15/2015 11:00 AM    MPV 8.4 10/15/2015 11:00 AM      Lab Results   Component Value Date/Time     10/15/2015 11:00 AM    K 4.6 10/15/2015 11:00 AM    CO2 26 10/15/2015 11:00 AM    BUN 19 10/15/2015 11:00 AM    CREATININE 1.1 10/15/2015 11:00 AM    CALCIUM 9.9 10/15/2015 11:00 AM    LABGLOM >60 10/15/2015 11:00 AM    GFRAA >60 10/15/2015 11:00 AM      No results found for: TSH, T4FREE, N4ORYBD, FT3, K7NEERI, TSI, TPOABS, THGAB  Lab Results   Component Value Date    LABA1C 7.5 11/25/2020    GLUCOSE 155 10/15/2015    MALBCR 84.8 10/15/2015    LABMICR 33.9 10/15/2015    LABCREA 40 10/15/2015     Lab Results   Component Value Date    LABA1C 7.5 11/25/2020    LABA1C 7.1 12/19/2019    LABA1C 7.4 12/07/2016     Lab Results   Component Value Date    TRIG 111 10/15/2015    HDL 44 10/15/2015    LDLCALC 101 10/15/2015    CHOL 167 10/15/2015     No results found for: VITD25    ASSESSMENT & RECOMMENDATIONS   Nabeel Chacon, a 73 y.o.-old male seen in for the following issues       Assessment:      Diagnosis Orders   1. Type 2 diabetes mellitus without complication, with long-term current use of insulin ([address]     2. Vitamin D deficiency     3. Dietary noncompliance     4. Class 1 obesity due to excess calories without serious comorbidity with body mass index (BMI) of 30.0 to 30.9 in adult         Plan:     1. Type 2 diabetes mellitus without complication, with long-term current use of insulin ([address]   · Patient's diabetes is slightly worsened.  A1c per recall 8%  · Will change DM regimen to  Basaglar  33 units daily at bedtime, Metformin 1000 mg BID, Jardiance 25 mg daily  · The patient was advised to check blood sugars 2 times a day before meals and at bedtime varying times and send BS readings to our office in 2 weeks.  · Pt not able to tolerate Joelle and Dexcom CGM due to skin reactions of adhesives  · Discussed with patient A1c and blood sugar goals   · Optimal blood sugars: 100-140 pre-prandial, < 180 peak post-prandial  · Patient was counselled about the importance of self-blood glucose monitoring and eating consistent carb diet to avoid blood sugar fluctuations   · Patient is up to date with routine diabetes maintenance and prevention  · [facility] (pt reports easier to have labs drawn through [place]'s services)  ·    2. Vitamin D deficiency   · [facility]  · Pt taking 50,000 iu monthly     3. Dietary noncompliance   · Discussed with patient the importance of eating consistent carbohydrate meals, avoiding high glycemic index food. Also, discussed with patient the risk and negative consequences of dietary noncompliance on blood glucose control, blood pressure and weight     4. Class 1 obesity due to excess calories without serious comorbidity with body mass index (BMI) of 30.0 to 30.9 in adult   · Discussed lifestyle changes including diet and exercise with patient in depth. Also discussed with patient cardiovascular risk associated with obesity       I personally spent > 30 minutes reviewing external notes from PCP and other patient's care team providers, and personally interpreted labs associated with the above diagnosis. I also ordered labs to further assess and manage the above addressed medical conditions. Return in about 4 months (around 7/3/2022). The above issues were reviewed with the patient who understood and agreed with the plan. Thank you for allowing us to participate in the care of this patient. Please do not hesitate to contact us with any additional questions.      Heather Rainey, AGUILAR - PRAVIN [facility] - BE[facility] Diabetes Care and Endocrinology   [address] [facility] [address]   Phone: [phone]  Fax: [phone]  --------------------------------------------  An electronic signature was used to authenticate this note. [address], AGUILAR - NP on 3/3/2022 at 2:51 PM    This visit was performed as a virtual video visit using a synchronous, two-way, audio-video telehealth technology platform  This Virtual  Visit was conducted, with patient's consent, to reduce the patient's risk of exposure to COVID-19 and provide continuity of care.

## 2022-03-03 NOTE — PROGRESS NOTES
Kirit Romero was read the following message We want to confirm that, for purposes of billing, this is a virtual visit with your provider for which we will submit a claim for reimbursement with your insurance company. You will be responsible for any copays, coinsurance amounts or other amounts not covered by your insurance company. If you do not accept this, unfortunately we will not be able to schedule or proceed with a virtual visit with the provider. Do you accept? Funmi Givens responded Yes .

## 2022-07-06 ENCOUNTER — OFFICE VISIT (OUTPATIENT)
Dept: ENDOCRINOLOGY | Age: 72
End: 2022-07-06
Payer: COMMERCIAL

## 2022-07-06 VITALS
HEART RATE: 83 BPM | BODY MASS INDEX: 27.76 KG/M2 | WEIGHT: 188 LBS | OXYGEN SATURATION: 97 % | DIASTOLIC BLOOD PRESSURE: 79 MMHG | SYSTOLIC BLOOD PRESSURE: 157 MMHG

## 2022-07-06 DIAGNOSIS — Z91.119 DIETARY NONCOMPLIANCE: ICD-10-CM

## 2022-07-06 DIAGNOSIS — E55.9 VITAMIN D DEFICIENCY: ICD-10-CM

## 2022-07-06 DIAGNOSIS — Z79.4 TYPE 2 DIABETES MELLITUS WITHOUT COMPLICATION, WITH LONG-TERM CURRENT USE OF INSULIN (HCC): ICD-10-CM

## 2022-07-06 DIAGNOSIS — Z79.4 TYPE 2 DIABETES MELLITUS WITHOUT COMPLICATION, WITH LONG-TERM CURRENT USE OF INSULIN (HCC): Primary | ICD-10-CM

## 2022-07-06 DIAGNOSIS — E11.9 TYPE 2 DIABETES MELLITUS WITHOUT COMPLICATION, WITH LONG-TERM CURRENT USE OF INSULIN (HCC): ICD-10-CM

## 2022-07-06 DIAGNOSIS — E11.9 TYPE 2 DIABETES MELLITUS WITHOUT COMPLICATION, WITH LONG-TERM CURRENT USE OF INSULIN (HCC): Primary | ICD-10-CM

## 2022-07-06 LAB
ANION GAP SERPL CALCULATED.3IONS-SCNC: 16 MMOL/L (ref 7–16)
BUN BLDV-MCNC: 25 MG/DL (ref 6–23)
CALCIUM SERPL-MCNC: 9.1 MG/DL (ref 8.6–10.2)
CHLORIDE BLD-SCNC: 104 MMOL/L (ref 98–107)
CHOLESTEROL, TOTAL: 107 MG/DL (ref 0–199)
CO2: 18 MMOL/L (ref 22–29)
CREAT SERPL-MCNC: 1.1 MG/DL (ref 0.7–1.2)
CREATININE URINE: 33 MG/DL (ref 40–278)
GFR AFRICAN AMERICAN: >60
GFR NON-AFRICAN AMERICAN: >60 ML/MIN/1.73
GLUCOSE BLD-MCNC: 231 MG/DL (ref 74–99)
HBA1C MFR BLD: 7.5 %
HCT VFR BLD CALC: 41.5 % (ref 37–54)
HDLC SERPL-MCNC: 36 MG/DL
HEMOGLOBIN: 13.8 G/DL (ref 12.5–16.5)
LDL CHOLESTEROL CALCULATED: 50 MG/DL (ref 0–99)
MCH RBC QN AUTO: 28.5 PG (ref 26–35)
MCHC RBC AUTO-ENTMCNC: 33.3 % (ref 32–34.5)
MCV RBC AUTO: 85.7 FL (ref 80–99.9)
PDW BLD-RTO: 13.5 FL (ref 11.5–15)
PLATELET # BLD: 259 E9/L (ref 130–450)
PMV BLD AUTO: 10 FL (ref 7–12)
POTASSIUM SERPL-SCNC: 3.9 MMOL/L (ref 3.5–5)
PROTEIN PROTEIN: 5 MG/DL (ref 0–12)
PROTEIN/CREAT RATIO: 0.2
PROTEIN/CREAT RATIO: 0.2 (ref 0–0.2)
RBC # BLD: 4.84 E12/L (ref 3.8–5.8)
SODIUM BLD-SCNC: 138 MMOL/L (ref 132–146)
TRIGL SERPL-MCNC: 104 MG/DL (ref 0–149)
VITAMIN D 25-HYDROXY: 32 NG/ML (ref 30–100)
VLDLC SERPL CALC-MCNC: 21 MG/DL
WBC # BLD: 8.3 E9/L (ref 4.5–11.5)

## 2022-07-06 PROCEDURE — 1123F ACP DISCUSS/DSCN MKR DOCD: CPT | Performed by: NURSE PRACTITIONER

## 2022-07-06 PROCEDURE — 83036 HEMOGLOBIN GLYCOSYLATED A1C: CPT | Performed by: NURSE PRACTITIONER

## 2022-07-06 PROCEDURE — 99214 OFFICE O/P EST MOD 30 MIN: CPT | Performed by: NURSE PRACTITIONER

## 2022-07-06 PROCEDURE — 3051F HG A1C>EQUAL 7.0%<8.0%: CPT | Performed by: NURSE PRACTITIONER

## 2022-07-06 NOTE — PROGRESS NOTES
[address] [facility] Department of Endocrinology Diabetes and Metabolism   [address] [facility] [address]   Phone: [phone]  Fax: [phone]    Date of Service: 7/6/2022    Primary Care Physician: Vijaya Sharp MD  Referring physician: No ref. provider found  Provider: AGUILAR Stoomayor NP     Reason for the visit:  Dm Type 2 on insulin    History of Present Illness: The history is provided by the patient. No  was used. Accuracy of the patient data is excellent. Prosper Jane is a very pleasant 73 y.o. male seen today for diabetes management     Prosper Jane was diagnosed with diabetes at age 50 and currently on Basaglar  33 units daily at bedtime, Metformin 1000 mg BID, Jardiance 25 mg daily    Pt not able to tolerate Leory Mayer and Dexcom CGM due to skin reactions of adhesives    He is checking every AM  Per recall 120-130    [facility] per [facility]  8.0% in March   Most recent A1c results summarized below  Lab Results   Component Value Date/Time    LABA1C 7.5 07/06/2022 10:57 AM    LABA1C 7.5 11/25/2020 12:15 PM    LABA1C 7.1 12/19/2019 09:47 AM     Patient has had no hypoglycemic episodes   The patient has been mindful of what has been eating and following diabetic diet as encouraged  I reviewed current medications and the patient has no issues with diabetes medications  Prosper Jane is up to date with eye exam and denied any history of diabetic retinopathy  The patient seeing podiatrist every 3-6 months. Microvascular complications:  No Retinopathy, Nephropathy or Neuropathy   Macrovascular complications: + CAD, no  PVD, or Stroke      PAST MEDICAL HISTORY   Past Medical History:   Diagnosis Date    Essential hypertension 10/15/2015    Type 2 diabetes mellitus without complication ([facility] [address] [id number]/82/[id number]       PAST SURGICAL HISTORY   No past surgical history on file. SOCIAL HISTORY   Tobacco:   reports that he has quit smoking.  He has never used smokeless tobacco.  Alcohol:   has no history on file for alcohol use. Drugs:   has no history on file for drug use.     FAMILY HISTORY   Family History   Problem Relation Age of Onset    Diabetes Father     Diabetes Paternal Grandfather        ALLERGIES AND DRUG REACTIONS   Allergies   Allergen Reactions    Metformin And Related Diarrhea    Beeswax        CURRENT MEDICATIONS   Current Outpatient Medications   Medication Sig Dispense Refill    insulin glargine (BASAGLAR KWIKPEN) 100 UNIT/ML injection pen Inject 30 Units into the skin nightly (Patient taking differently: Inject 33 Units into the skin nightly ) 15 mL 5    metFORMIN (GLUCOPHAGE XR) 500 MG extended release tablet Take 2 tablets by mouth 2 times daily 360 tablet 3    empagliflozin (JARDIANCE) 25 MG tablet Take 25 mg by mouth daily 90 tablet 3    atorvastatin (LIPITOR) 80 MG tablet TAKE 1 TABLET BY MOUTH EVERY NIGHT AT BEDTIME 30 tablet 2    blood glucose test strips (ONE TOUCH ULTRA TEST) strip To test 4x/day 50 each 11    Insulin Pen Needle (BD PEN NEEDLE PATTIE U/F) 32G X 4 MM MISC To use 1x/day 100 each 3    Continuous Blood Gluc Sensor (DEXCOM G6 SENSOR) MISC Change sensor every 10 days (Patient not taking: Reported on 7/6/2022) 9 each 3    Continuous Blood Gluc Transmit (DEXCOM G6 TRANSMITTER) MISC To use with sensor to change every 90 days (Patient not taking: Reported on 7/6/2022) 1 each 3    Continuous Blood Gluc Sensor (FREESTYLE KATALINA 14 DAY SENSOR) MISC Change sensor every 14 days (Patient not taking: Reported on 7/6/2022) 6 each 3    Continuous Blood Gluc  (FREESTYLE KATALINA 14 DAY READER) TIFFANY by Does not apply route (Patient not taking: Reported on 7/6/2022)      aspirin 81 MG tablet Take 81 mg by mouth daily      amLODIPine (NORVASC) 10 MG tablet TAKE 1 TABLET BY MOUTH EVERY DAY 30 tablet 2    terazosin (HYTRIN) 2 MG capsule TAKE 1 CAPSULE BY MOUTH TWICE DAILY 60 capsule 2    lisinopril (PRINIVIL;ZESTRIL) 20 MG tablet Take 1 tablet by mouth daily 30 tablet 3    EPINEPHrine (EPIPEN 2-RENITA) 0.3 MG/0.3ML SOAJ injection Inject 0.3 mLs into the skin once for 1 dose Use as directed for allergic reaction 0.3 mL 0     No current facility-administered medications for this visit. Review of Systems  Constitutional: No fever, no chills, no diaphoresis, no generalized weakness. HEENT: No blurred vision, No sore throat, no ear pain, no hair loss  Neck: denied any neck swelling, difficulty swallowing,   Cardio-pulmonary: No CP, SOB or palpitation, No orthopnea or PND. No cough or wheezing. GI: No N/V/D, no constipation, No abdominal pain, no melena or hematochezia   : Denied any dysuria, hematuria, flank pain, discharge, or incontinence. Skin: denied any rash, ulcer, Hirsute, or hyperpigmentation. MSK: denied any joint deformity, joint pain/swelling, muscle pain, or back pain. Neuro: no numbness, no tingling, no weakness    OBJECTIVE    BP (!) 157/79   Pulse 83   Wt 188 lb (85.3 kg)   SpO2 97%   BMI 27.76 kg/m²   BP Readings from Last 4 Encounters:   07/06/22 (!) 157/79   12/19/19 136/78   12/07/16 125/67   03/31/16 140/80     Wt Readings from Last 6 Encounters:   07/06/22 188 lb (85.3 kg)   12/19/19 204 lb 3.2 oz (92.6 kg)   12/07/16 216 lb (98 kg)   03/31/16 212 lb (96.2 kg)   11/16/15 204 lb (92.5 kg)   10/15/15 203 lb (92.1 kg)     Physical examination:  General: awake alert, oriented x3, no abnormal position or movements. HEENT: normocephalic non-traumatic, no exophthalmos   Neck: supple, no LN enlargement, no thyromegaly, no thyroid tenderness, no JVD. Pulm: Clear equal air entry no added sounds, no wheezing or rhonchi    CVS: S1 + S2, no murmur, no heave. Dorsalis pedis pulse palpable   Abd: soft lax, no tenderness, no organomegaly, audible bowel sounds. Skin: warm, no lesions, no rash.  No callus, no Ulcers, No acanthosis nigricans  Musculoskeletal: No back tenderness, no kyphosis/scoliosis    Neuro: CN intact,  sensation normal bilateral , muscle power normal  Psych: normal mood, and affect    Review of Laboratory Data:  I personally reviewed the following lab:  Lab Results   Component Value Date/Time    WBC 8.3 10/15/2015 11:00 AM    RBC 5.26 10/15/2015 11:00 AM    HGB 14.8 10/15/2015 11:00 AM    HCT 45.6 10/15/2015 11:00 AM    MCV 86.6 10/15/2015 11:00 AM    MCH 28.0 10/15/2015 11:00 AM    MCHC 32.4 10/15/2015 11:00 AM    RDW 14.0 10/15/2015 11:00 AM     10/15/2015 11:00 AM    MPV 8.4 10/15/2015 11:00 AM      Lab Results   Component Value Date/Time     10/15/2015 11:00 AM    K 4.6 10/15/2015 11:00 AM    CO2 26 10/15/2015 11:00 AM    BUN 19 10/15/2015 11:00 AM    CREATININE 1.1 10/15/2015 11:00 AM    CALCIUM 9.9 10/15/2015 11:00 AM    LABGLOM >60 10/15/2015 11:00 AM    GFRAA >60 10/15/2015 11:00 AM      No results found for: TSH, T4FREE, W7ZJXMN, FT3, F3YVPPU, TSI, TPOABS, THGAB  Lab Results   Component Value Date/Time    LABA1C 7.5 07/06/2022 10:57 AM    GLUCOSE 155 10/15/2015 11:00 AM    MALBCR 84.8 10/15/2015 12:00 PM    LABMICR 33.9 10/15/2015 12:00 PM    LABCREA 40 10/15/2015 12:00 PM     Lab Results   Component Value Date/Time    LABA1C 7.5 07/06/2022 10:57 AM    LABA1C 7.5 11/25/2020 12:15 PM    LABA1C 7.1 12/19/2019 09:47 AM     Lab Results   Component Value Date/Time    TRIG 111 10/15/2015 11:00 AM    HDL 44 10/15/2015 11:00 AM    LDLCALC 101 10/15/2015 11:00 AM    CHOL 167 10/15/2015 11:00 AM     No results found for: VITD25    ASSESSMENT & RECOMMENDATIONS   Jamaica Kent, a 73 y.o.-old male seen in for the following issues       Assessment:      Diagnosis Orders   1. Type 2 diabetes mellitus without complication, with long-term current use of insulin (HCC)  POCT glycosylated hemoglobin (Hb A1C)    LIPID PANEL    Basic Metabolic Panel    CBC    PROTEIN / CREATININE RATIO, URINE   2. Vitamin D deficiency  Vitamin D 25 Hydroxy   3. Dietary noncompliance         Plan:     1.  Type 2 diabetes mellitus without complication, with long-term current use of insulin (HCC)   · Patient's diabetes is improving, 7.5% form 8.0%  · Will change DM regimen to Basaglar 35 units daily at bedtime, Metformin 1000 mg BID, Jardiance 25 mg daily  · The patient was advised to check blood sugars 2 times a day before meals and at bedtime varying times and send BS readings to our office in 2 weeks. · Pt not able to tolerate Joelle and Dexcom CGM due to skin reactions of adhesives  · Discussed with patient A1c and blood sugar goals   · Will refer for [facility] for CGM  As pt is on longterm insulin   · Optimal blood sugars: 100-140 pre-prandial, < 180 peak post-prandial  · Patient was counselled about the importance of self-blood glucose monitoring and eating consistent carb diet to avoid blood sugar fluctuations   · Patient is up to date with routine diabetes maintenance and prevention  · Diabetes labs before to be drawn today      2. Vitamin D deficiency   · [facility]  · Pt taking 50,000 iu monthly  · Needs recheck level     3. Dietary noncompliance   · Discussed with patient the importance of eating consistent carbohydrate meals, avoiding high glycemic index food. Also, discussed with patient the risk and negative consequences of dietary noncompliance on blood glucose control, blood pressure and weight             I personally spent > 30 minutes reviewing external notes from PCP and other patient's care team providers, and personally interpreted labs associated with the above diagnosis. I also ordered labs to further assess and manage the above addressed medical conditions. Return in about 4 months (around 11/6/2022) for type 2 dm. The above issues were reviewed with the patient who understood and agreed with the plan. Thank you for allowing us to participate in the care of this patient. Please do not hesitate to contact us with any additional questions.      Chelsey Ferrari, AGUILAR - PRAVIN [facility] Diabetes Care and Endocrinology   [address] [facility] [address]   Phone: [phone]  Fax: [phone]  --------------------------------------------  An electronic signature was used to authenticate this note.  AGUILAR Guo - NP on 7/6/2022 at 11:11 AM

## 2022-07-07 DIAGNOSIS — E11.9 TYPE 2 DIABETES MELLITUS WITHOUT COMPLICATION, WITH LONG-TERM CURRENT USE OF INSULIN (HCC): Primary | ICD-10-CM

## 2022-07-07 DIAGNOSIS — Z79.4 TYPE 2 DIABETES MELLITUS WITHOUT COMPLICATION, WITH LONG-TERM CURRENT USE OF INSULIN (HCC): Primary | ICD-10-CM

## 2022-07-28 DIAGNOSIS — Z79.4 TYPE 2 DIABETES MELLITUS WITHOUT COMPLICATION, WITH LONG-TERM CURRENT USE OF INSULIN (HCC): ICD-10-CM

## 2022-07-28 DIAGNOSIS — E11.9 TYPE 2 DIABETES MELLITUS WITHOUT COMPLICATION, WITH LONG-TERM CURRENT USE OF INSULIN (HCC): ICD-10-CM

## 2022-07-28 RX ORDER — METFORMIN HYDROCHLORIDE 500 MG/1
1000 TABLET, EXTENDED RELEASE ORAL 2 TIMES DAILY
Qty: 360 TABLET | Refills: 3 | Status: SHIPPED | OUTPATIENT
Start: 2022-07-28

## 2022-07-28 RX ORDER — PEN NEEDLE, DIABETIC 32GX 5/32"
NEEDLE, DISPOSABLE MISCELLANEOUS
Qty: 100 EACH | Refills: 3 | Status: SHIPPED | OUTPATIENT
Start: 2022-07-28

## 2022-07-29 ENCOUNTER — TELEPHONE (OUTPATIENT)
Dept: ENDOCRINOLOGY | Age: 72
End: 2022-07-29

## 2022-07-29 NOTE — TELEPHONE ENCOUNTER
Ivory Herron called in asking us to send in his medication as requested. He checked with the pharmacy yesterday and they did not have his refill. I checked with our system and we had sent it in 7/28/22 with confirmed receipt from the pharm. I called pharm they did have it, they will get it ready. I returned call and left a VM that we had sent it over yesterday and they will get it ready for him.

## 2022-08-29 DIAGNOSIS — E11.9 TYPE 2 DIABETES MELLITUS WITHOUT COMPLICATION, WITH LONG-TERM CURRENT USE OF INSULIN (HCC): ICD-10-CM

## 2022-08-29 DIAGNOSIS — Z79.4 TYPE 2 DIABETES MELLITUS WITHOUT COMPLICATION, WITH LONG-TERM CURRENT USE OF INSULIN (HCC): ICD-10-CM

## 2023-05-20 DIAGNOSIS — E11.9 TYPE 2 DIABETES MELLITUS WITHOUT COMPLICATION, WITH LONG-TERM CURRENT USE OF INSULIN (HCC): ICD-10-CM

## 2023-05-20 DIAGNOSIS — Z79.4 TYPE 2 DIABETES MELLITUS WITHOUT COMPLICATION, WITH LONG-TERM CURRENT USE OF INSULIN (HCC): ICD-10-CM

## 2023-05-22 RX ORDER — BLOOD SUGAR DIAGNOSTIC
STRIP MISCELLANEOUS
Qty: 150 STRIP | Refills: 5 | Status: SHIPPED | OUTPATIENT
Start: 2023-05-22

## 2023-06-21 DIAGNOSIS — E11.9 TYPE 2 DIABETES MELLITUS WITHOUT COMPLICATION, WITH LONG-TERM CURRENT USE OF INSULIN (HCC): ICD-10-CM

## 2023-06-21 DIAGNOSIS — Z79.4 TYPE 2 DIABETES MELLITUS WITHOUT COMPLICATION, WITH LONG-TERM CURRENT USE OF INSULIN (HCC): ICD-10-CM

## 2023-06-21 RX ORDER — INSULIN GLARGINE 100 [IU]/ML
INJECTION, SOLUTION SUBCUTANEOUS
Qty: 15 ML | Refills: 6 | OUTPATIENT
Start: 2023-06-21

## 2023-07-03 DIAGNOSIS — E11.9 TYPE 2 DIABETES MELLITUS WITHOUT COMPLICATION, WITH LONG-TERM CURRENT USE OF INSULIN (HCC): ICD-10-CM

## 2023-07-03 DIAGNOSIS — Z79.4 TYPE 2 DIABETES MELLITUS WITHOUT COMPLICATION, WITH LONG-TERM CURRENT USE OF INSULIN (HCC): ICD-10-CM

## 2023-07-03 RX ORDER — INSULIN GLARGINE 100 [IU]/ML
INJECTION, SOLUTION SUBCUTANEOUS
Qty: 15 ML | Refills: 6 | Status: SHIPPED | OUTPATIENT
Start: 2023-07-03

## 2023-07-03 NOTE — TELEPHONE ENCOUNTER
Pt called office stated that he is completely out and and need this filled immediately it was declined on the 21 and pt stated not sure why.

## 2023-07-26 RX ORDER — METFORMIN HYDROCHLORIDE 500 MG/1
TABLET, EXTENDED RELEASE ORAL
Qty: 360 TABLET | Refills: 0 | Status: SHIPPED | OUTPATIENT
Start: 2023-07-26

## 2023-08-15 DIAGNOSIS — E11.9 TYPE 2 DIABETES MELLITUS WITHOUT COMPLICATION, WITH LONG-TERM CURRENT USE OF INSULIN (HCC): ICD-10-CM

## 2023-08-15 DIAGNOSIS — Z79.4 TYPE 2 DIABETES MELLITUS WITHOUT COMPLICATION, WITH LONG-TERM CURRENT USE OF INSULIN (HCC): ICD-10-CM

## 2023-08-15 RX ORDER — INSULIN GLARGINE 100 [IU]/ML
INJECTION, SOLUTION SUBCUTANEOUS
Qty: 15 ML | Refills: 5 | Status: SHIPPED | OUTPATIENT
Start: 2023-08-15

## 2023-08-15 RX ORDER — PEN NEEDLE, DIABETIC 32GX 5/32"
NEEDLE, DISPOSABLE MISCELLANEOUS
Qty: 100 EACH | Refills: 3 | Status: SHIPPED | OUTPATIENT
Start: 2023-08-15

## 2023-10-30 RX ORDER — METFORMIN HYDROCHLORIDE 500 MG/1
TABLET, EXTENDED RELEASE ORAL
Qty: 360 TABLET | Refills: 0 | OUTPATIENT
Start: 2023-10-30

## 2023-11-02 DIAGNOSIS — Z79.4 TYPE 2 DIABETES MELLITUS WITHOUT COMPLICATION, WITH LONG-TERM CURRENT USE OF INSULIN (HCC): Primary | ICD-10-CM

## 2023-11-02 DIAGNOSIS — E11.9 TYPE 2 DIABETES MELLITUS WITHOUT COMPLICATION, WITH LONG-TERM CURRENT USE OF INSULIN (HCC): Primary | ICD-10-CM

## 2023-11-02 RX ORDER — METFORMIN HYDROCHLORIDE 500 MG/1
TABLET, EXTENDED RELEASE ORAL
Qty: 360 TABLET | Refills: 0 | Status: SHIPPED | OUTPATIENT
Start: 2023-11-02

## 2023-12-28 DIAGNOSIS — Z79.4 TYPE 2 DIABETES MELLITUS WITHOUT COMPLICATION, WITH LONG-TERM CURRENT USE OF INSULIN (HCC): Primary | ICD-10-CM

## 2023-12-28 DIAGNOSIS — E11.9 TYPE 2 DIABETES MELLITUS WITHOUT COMPLICATION, WITH LONG-TERM CURRENT USE OF INSULIN (HCC): Primary | ICD-10-CM

## 2023-12-28 RX ORDER — INSULIN GLARGINE 100 [IU]/ML
INJECTION, SOLUTION SUBCUTANEOUS
Qty: 15 ADJUSTABLE DOSE PRE-FILLED PEN SYRINGE | Refills: 3 | Status: SHIPPED | OUTPATIENT
Start: 2023-12-28

## 2023-12-28 NOTE — TELEPHONE ENCOUNTER
Patient called and said that you told him the first of the year he could change from basaglar to lantus because insurance no longer covering basaglar.  Sent in new script for the lantus

## 2024-02-02 DIAGNOSIS — Z79.4 TYPE 2 DIABETES MELLITUS WITHOUT COMPLICATION, WITH LONG-TERM CURRENT USE OF INSULIN (HCC): ICD-10-CM

## 2024-02-02 DIAGNOSIS — E11.9 TYPE 2 DIABETES MELLITUS WITHOUT COMPLICATION, WITH LONG-TERM CURRENT USE OF INSULIN (HCC): ICD-10-CM

## 2024-02-02 RX ORDER — METFORMIN HYDROCHLORIDE 500 MG/1
TABLET, EXTENDED RELEASE ORAL
Qty: 360 TABLET | Refills: 0 | Status: SHIPPED | OUTPATIENT
Start: 2024-02-02

## 2024-04-03 ENCOUNTER — OFFICE VISIT (OUTPATIENT)
Dept: ENDOCRINOLOGY | Age: 74
End: 2024-04-03
Payer: COMMERCIAL

## 2024-04-03 VITALS
HEIGHT: 69 IN | DIASTOLIC BLOOD PRESSURE: 66 MMHG | BODY MASS INDEX: 27.55 KG/M2 | SYSTOLIC BLOOD PRESSURE: 166 MMHG | WEIGHT: 186 LBS

## 2024-04-03 DIAGNOSIS — E11.9 TYPE 2 DIABETES MELLITUS WITHOUT COMPLICATION, WITH LONG-TERM CURRENT USE OF INSULIN (HCC): Primary | ICD-10-CM

## 2024-04-03 DIAGNOSIS — Z79.4 TYPE 2 DIABETES MELLITUS WITHOUT COMPLICATION, WITH LONG-TERM CURRENT USE OF INSULIN (HCC): Primary | ICD-10-CM

## 2024-04-03 LAB — HBA1C MFR BLD: 7.6 %

## 2024-04-03 PROCEDURE — 1123F ACP DISCUSS/DSCN MKR DOCD: CPT | Performed by: NURSE PRACTITIONER

## 2024-04-03 PROCEDURE — 83036 HEMOGLOBIN GLYCOSYLATED A1C: CPT | Performed by: NURSE PRACTITIONER

## 2024-04-03 PROCEDURE — 3077F SYST BP >= 140 MM HG: CPT | Performed by: NURSE PRACTITIONER

## 2024-04-03 PROCEDURE — 99214 OFFICE O/P EST MOD 30 MIN: CPT | Performed by: NURSE PRACTITIONER

## 2024-04-03 PROCEDURE — 3078F DIAST BP <80 MM HG: CPT | Performed by: NURSE PRACTITIONER

## 2024-04-03 RX ORDER — GLIPIZIDE 2.5 MG/1
2.5 TABLET, EXTENDED RELEASE ORAL DAILY
Qty: 30 TABLET | Refills: 3 | Status: SHIPPED | OUTPATIENT
Start: 2024-04-03

## 2024-04-03 NOTE — PROGRESS NOTES
[facility] [place]  [facility] Department of Endocrinology Diabetes and Metabolism   [address], [place] [address]  Phone: [phone]  Fax: [phone]    Date of Service: 4/3/2024    Primary Care Physician: Azeem Boyd MD  Referring physician: No ref. provider found  Provider: AGUILAR Linder NP     Reason for the visit:  Dm Type 2 on insulin    History of Present Illness:  The history is provided by the patient. No  was used. Accuracy of the patient data is excellent.  Jensen Morales is a very pleasant 73 y.o. male seen today for diabetes management     Last OV  7/2022, since last OV was dx with prostate cancer and underwent chemotherapy and radiation     Jensen Morales was diagnosed with diabetes at age 52 and currently on Lantus 35 units daily at bedtime, Metformin 1000 mg BID, Jardiance 25 mg daily    Pt not able to tolerate Joelle and Dexcom CGM due to skin reactions of adhesives    He is checking in the  AM    Per recall FBS  130    [facility]    Most recent A1c results summarized below  Lab Results   Component Value Date/Time    LABA1C 7.5 07/06/2022 10:57 AM    LABA1C 7.5 11/25/2020 12:15 PM    LABA1C 7.1 12/19/2019 09:47 AM     Patient has had no hypoglycemic episodes   The patient has been mindful of what has been eating and following diabetic diet as encouraged  I reviewed current medications and the patient has no issues with diabetes medications  Jensen Morales is up to date with eye exam and denied any history of diabetic retinopathy  The patient seeing podiatrist every 3-6 months.   Microvascular complications:  No Retinopathy, Nephropathy or Neuropathy   Macrovascular complications: + CAD, no  PVD, or Stroke      PAST MEDICAL HISTORY   Past Medical History:   Diagnosis Date    Essential hypertension 10/15/2015    Prostate cancer (HCC)     Type 2 diabetes mellitus without complication (HCC) 10/15/2015

## 2024-04-05 ENCOUNTER — TELEPHONE (OUTPATIENT)
Dept: ENDOCRINOLOGY | Age: 74
End: 2024-04-05

## 2024-04-05 NOTE — TELEPHONE ENCOUNTER
PATIENT CALLED AND HE SAID THAT HE WAS GIVEN GLIZIPIDE 2.5  4/3/24 AND IT IS GIVING HIM A UPSET STOMACH AND IS WONDERING WHAT TO DO?

## 2024-05-03 DIAGNOSIS — Z79.4 TYPE 2 DIABETES MELLITUS WITHOUT COMPLICATION, WITH LONG-TERM CURRENT USE OF INSULIN (HCC): ICD-10-CM

## 2024-05-03 DIAGNOSIS — E11.9 TYPE 2 DIABETES MELLITUS WITHOUT COMPLICATION, WITH LONG-TERM CURRENT USE OF INSULIN (HCC): ICD-10-CM

## 2024-05-06 RX ORDER — METFORMIN HYDROCHLORIDE 500 MG/1
TABLET, EXTENDED RELEASE ORAL
Qty: 360 TABLET | Refills: 2 | Status: SHIPPED | OUTPATIENT
Start: 2024-05-06

## 2024-07-30 DIAGNOSIS — Z79.4 TYPE 2 DIABETES MELLITUS WITHOUT COMPLICATION, WITH LONG-TERM CURRENT USE OF INSULIN (HCC): ICD-10-CM

## 2024-07-30 DIAGNOSIS — E11.9 TYPE 2 DIABETES MELLITUS WITHOUT COMPLICATION, WITH LONG-TERM CURRENT USE OF INSULIN (HCC): ICD-10-CM

## 2024-07-30 RX ORDER — INSULIN GLARGINE 100 [IU]/ML
INJECTION, SOLUTION SUBCUTANEOUS
Qty: 15 ML | Refills: 0 | Status: SHIPPED | OUTPATIENT
Start: 2024-07-30

## 2024-08-05 ENCOUNTER — TELEPHONE (OUTPATIENT)
Dept: ADMINISTRATIVE | Age: 74
End: 2024-08-05

## 2024-08-05 NOTE — TELEPHONE ENCOUNTER
Pt rescheduled appt until 10/31 due to work conflict.  He will need a refill by the end of this month on Lantus pen needles.  His pharmacy is [facility].

## 2024-08-06 DIAGNOSIS — E11.9 TYPE 2 DIABETES MELLITUS WITHOUT COMPLICATION, WITH LONG-TERM CURRENT USE OF INSULIN (HCC): ICD-10-CM

## 2024-08-06 DIAGNOSIS — Z79.4 TYPE 2 DIABETES MELLITUS WITHOUT COMPLICATION, WITH LONG-TERM CURRENT USE OF INSULIN (HCC): ICD-10-CM

## 2024-08-06 RX ORDER — PEN NEEDLE, DIABETIC 32GX 5/32"
NEEDLE, DISPOSABLE MISCELLANEOUS
Qty: 100 EACH | Refills: 3 | Status: SHIPPED | OUTPATIENT
Start: 2024-08-06